# Patient Record
Sex: FEMALE | Race: WHITE | NOT HISPANIC OR LATINO | URBAN - NONMETROPOLITAN AREA
[De-identification: names, ages, dates, MRNs, and addresses within clinical notes are randomized per-mention and may not be internally consistent; named-entity substitution may affect disease eponyms.]

---

## 2018-12-05 PROBLEM — H04.123: Noted: 2018-12-05

## 2019-04-11 ENCOUNTER — IMPORTED ENCOUNTER (OUTPATIENT)
Dept: URBAN - NONMETROPOLITAN AREA CLINIC 1 | Facility: CLINIC | Age: 84
End: 2019-04-11

## 2019-04-11 PROCEDURE — 92012 INTRM OPH EXAM EST PATIENT: CPT

## 2019-04-11 NOTE — PATIENT DISCUSSION
*PRIMARY OPEN ANGLE GLAUCOMA:.-  Discussed findings of exam in detail with the patient. -  discussed the chronic progressive nature of this disease and various treatment options. -  importance of good compliance with medications was emphasized. 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 Pt will contact us if this does not provide relief. Consider punctal plugs in that case. INCREASE CROMYLN BID OUCNT SYSTANECONSIDER SWITCH TO 0.2% OLOPATADINE DUE TO INSURANCE CHANGE

## 2019-10-31 ENCOUNTER — IMPORTED ENCOUNTER (OUTPATIENT)
Dept: URBAN - NONMETROPOLITAN AREA CLINIC 1 | Facility: CLINIC | Age: 84
End: 2019-10-31

## 2019-10-31 PROCEDURE — 92012 INTRM OPH EXAM EST PATIENT: CPT

## 2022-04-09 ASSESSMENT — TONOMETRY
OD_IOP_MMHG: 14
OS_IOP_MMHG: 14
OD_IOP_MMHG: 15
OS_IOP_MMHG: 14

## 2022-04-09 ASSESSMENT — VISUAL ACUITY
OS_CC: J1
OD_CC: 20/40
OS_CC: 20/40
OD_SC: 20/30
OS_SC: 20/30
OD_CC: J1